# Patient Record
Sex: FEMALE | Race: OTHER | NOT HISPANIC OR LATINO
[De-identification: names, ages, dates, MRNs, and addresses within clinical notes are randomized per-mention and may not be internally consistent; named-entity substitution may affect disease eponyms.]

---

## 2017-04-17 ENCOUNTER — APPOINTMENT (OUTPATIENT)
Dept: PULMONOLOGY | Facility: CLINIC | Age: 62
End: 2017-04-17

## 2017-10-31 ENCOUNTER — RX RENEWAL (OUTPATIENT)
Age: 62
End: 2017-10-31

## 2018-01-08 ENCOUNTER — MEDICATION RENEWAL (OUTPATIENT)
Age: 63
End: 2018-01-08

## 2018-04-04 ENCOUNTER — APPOINTMENT (OUTPATIENT)
Dept: PULMONOLOGY | Facility: CLINIC | Age: 63
End: 2018-04-04

## 2018-04-30 ENCOUNTER — RX RENEWAL (OUTPATIENT)
Age: 63
End: 2018-04-30

## 2018-04-30 ENCOUNTER — APPOINTMENT (OUTPATIENT)
Dept: PULMONOLOGY | Facility: CLINIC | Age: 63
End: 2018-04-30

## 2018-05-03 ENCOUNTER — APPOINTMENT (OUTPATIENT)
Dept: PULMONOLOGY | Facility: CLINIC | Age: 63
End: 2018-05-03
Payer: COMMERCIAL

## 2018-05-03 VITALS
DIASTOLIC BLOOD PRESSURE: 78 MMHG | BODY MASS INDEX: 33.97 KG/M2 | HEART RATE: 100 BPM | SYSTOLIC BLOOD PRESSURE: 118 MMHG | OXYGEN SATURATION: 96 % | HEIGHT: 64 IN | WEIGHT: 199 LBS | TEMPERATURE: 98.6 F

## 2018-05-03 DIAGNOSIS — J45.901 UNSPECIFIED ASTHMA WITH (ACUTE) EXACERBATION: ICD-10-CM

## 2018-05-03 PROCEDURE — 99214 OFFICE O/P EST MOD 30 MIN: CPT | Mod: 25

## 2018-05-03 PROCEDURE — 94010 BREATHING CAPACITY TEST: CPT

## 2018-11-26 ENCOUNTER — APPOINTMENT (OUTPATIENT)
Dept: PULMONOLOGY | Facility: CLINIC | Age: 63
End: 2018-11-26
Payer: COMMERCIAL

## 2018-11-26 VITALS
BODY MASS INDEX: 33.97 KG/M2 | TEMPERATURE: 97.2 F | OXYGEN SATURATION: 98 % | HEIGHT: 64 IN | WEIGHT: 199 LBS | SYSTOLIC BLOOD PRESSURE: 130 MMHG | HEART RATE: 88 BPM | DIASTOLIC BLOOD PRESSURE: 88 MMHG

## 2018-11-26 PROCEDURE — 90686 IIV4 VACC NO PRSV 0.5 ML IM: CPT

## 2018-11-26 PROCEDURE — G0008: CPT

## 2018-11-26 PROCEDURE — 99213 OFFICE O/P EST LOW 20 MIN: CPT | Mod: 25

## 2018-11-26 PROCEDURE — 94010 BREATHING CAPACITY TEST: CPT

## 2018-11-26 RX ORDER — PREDNISONE 10 MG/1
10 TABLET ORAL
Qty: 30 | Refills: 0 | Status: COMPLETED | COMMUNITY
Start: 2018-05-03 | End: 2018-11-26

## 2019-06-24 ENCOUNTER — APPOINTMENT (OUTPATIENT)
Dept: PULMONOLOGY | Facility: CLINIC | Age: 64
End: 2019-06-24
Payer: COMMERCIAL

## 2019-06-24 VITALS
WEIGHT: 214 LBS | DIASTOLIC BLOOD PRESSURE: 76 MMHG | SYSTOLIC BLOOD PRESSURE: 120 MMHG | OXYGEN SATURATION: 97 % | BODY MASS INDEX: 36.54 KG/M2 | TEMPERATURE: 97.6 F | HEIGHT: 64 IN | HEART RATE: 82 BPM

## 2019-06-24 DIAGNOSIS — F17.200 NICOTINE DEPENDENCE, UNSPECIFIED, UNCOMPLICATED: ICD-10-CM

## 2019-06-24 PROCEDURE — 99213 OFFICE O/P EST LOW 20 MIN: CPT | Mod: 25

## 2019-06-24 PROCEDURE — 94010 BREATHING CAPACITY TEST: CPT

## 2019-06-24 NOTE — PHYSICAL EXAM
[General Appearance - In No Acute Distress] : no acute distress [Heart Rate And Rhythm] : heart rate and rhythm were normal [Normal Oropharynx] : normal oropharynx [Heart Sounds] : normal S1 and S2 [Murmurs] : no murmurs present [Auscultation Breath Sounds / Voice Sounds] : lungs were clear to auscultation bilaterally

## 2019-06-24 NOTE — ASSESSMENT
[FreeTextEntry1] : Data reviewed:\par \par PA and lateral chest x-ray [address] Radiology 10/26/15 report only provided: No focal consolidation or effusion seen\par \par Spirometry 11/4/15: mild EAO, ratio 64%, FEV1 79% (Advair 250/50) / FENO 52\par PFT 12/3/15 on Advair 500/50: no EAO and no response to IBD, hyperinflation/gas trapping, normal DLCO\par [place] 4/11/16: normal\par [place] 10/17/16: normal\par [place] 5/3/18: severe obstruction. FEV1 50%\par [place] 11/27/18: normal\par [place] 6/24/19: normal\par \par Impression:\par Asthma without exacerbation\par Tobacco dependence - ~1/2ppd x 40 years\par \par Plan:\par Continue Advair 500/50. Albuterol prn.\par Smoking cessation encouraged.\par Not a candidate for LDCT screening by chris fine.\par Routine follow up 6 mos.

## 2019-12-10 ENCOUNTER — APPOINTMENT (OUTPATIENT)
Dept: PULMONOLOGY | Facility: CLINIC | Age: 64
End: 2019-12-10
Payer: COMMERCIAL

## 2019-12-10 VITALS
BODY MASS INDEX: 35.34 KG/M2 | HEART RATE: 90 BPM | WEIGHT: 207 LBS | TEMPERATURE: 97.4 F | DIASTOLIC BLOOD PRESSURE: 68 MMHG | OXYGEN SATURATION: 97 % | HEIGHT: 64 IN | SYSTOLIC BLOOD PRESSURE: 122 MMHG

## 2019-12-10 DIAGNOSIS — J45.909 UNSPECIFIED ASTHMA, UNCOMPLICATED: ICD-10-CM

## 2019-12-10 PROCEDURE — 94010 BREATHING CAPACITY TEST: CPT

## 2019-12-10 PROCEDURE — 99213 OFFICE O/P EST LOW 20 MIN: CPT | Mod: 25

## 2019-12-10 RX ORDER — NICOTINE POLACRILEX 2 MG/1
2 GUM, CHEWING BUCCAL
Qty: 1 | Refills: 1 | Status: ACTIVE | COMMUNITY
Start: 2019-12-10 | End: 1900-01-01

## 2019-12-10 NOTE — ASSESSMENT
[FreeTextEntry1] : Data reviewed:\par \par PA/lat CXR [address] Radiology 10/26/15 report only provided: No focal consolidation or effusion seen\par \par Spirometry 11/4/15: mild EAO, ratio 64%, FEV1 79% (Advair 250/50) / FENO 52\par PFT 12/3/15 on Advair 500/50: no EAO and no response to IBD, hyperinflation/gas trapping, normal DLCO\par [place] 4/11/16: normal\par [place] 10/17/16: normal\par [place] 5/3/18: severe obstruction. FEV1 50%\par [place] 11/27/18: normal\par [place] 6/24/19: normal\par [place] 12/10/19: normal\par \par Impression:\par Asthma without exacerbation\par Tobacco dependence - ~1/2ppd x 40 years, working on quitting\par \par Plan:\par Continue Advair 500/50. Albuterol prn.\par Smoking cessation encouraged. Gum rx'd.\par Not a candidate for LDCT screening by chris fine.\par Routine follow up 6 mos.

## 2019-12-10 NOTE — HISTORY OF PRESENT ILLNESS
[FreeTextEntry1] : 11/4/15: Asked to evaluate patient, a daily smoker, by Dr. Corona for asthma/bronchitis. Symptoms started about a month ago with dyspnea, wheezing, chest tightness, cough with sensation of phlegm that she can't clear. Gets this syndrome 1-2x a year. Sought care early on and got Abx, prednisone, albuterol nebs with improvement. She was seen again 10/26 and given Advair 250, Singulair, prednisone and Augmentin. She wakes up with a sense of chest congestion. She doesn't clearly relate having asthma but says she was on Advair 250 prior to this illness and taking it faithfully prior to this illness. She uses Advair 250 year round. No childhood asthma. Was admitted at [facility] for respiratory disease a couple of years ago. Ends up on prednisone at least annually it sounds like. [place] showed mild EAO and FENO was 52. Increased her to Advair 500/50 and rx'd Nicotrol.\par \par 12/3/15: Thinks the higher dose Advair helped and she is breathing normally. She has a sense of constant congestion in her head. Feels PND. Taking Tylenol Sinus. Using albuterol bid standing but not at all prn. Still smoking 2-3 cigs a day and waiting for beginning of year to try the Nicotrol inhaler.\par \par 4/11/16: Doing well on Advair 500/50. Not needing albuterol. No intercurrent exacerbations. Does have a productive cough in the morning. Thinks she has a little PND. Says she's smoking 2-3 cigarettes a day, 1 pack per week. Not using the Nicotrol. We left her on Advair 500/50 due to a history of poor control on a lower dose, and we discussed smoking cessation.\par \par 10/17/16: Comes in having cold/allergy symptoms and chest congestion. Saw MD, got azithro and nasal spray. Has a sensation of phlegm she can't clear. Chest noisy in the morning. Not dyspneic, no fever. Missed a few doses of Advair right before this happened, in the setting of her brother's death, but otherwise faithful. Still smoking < 1/2 ppd. Still hasn't used the Nicotrol inhaler.\par \par 5/3/18: Continues on Advair 500/50 with good reported compliance. 2-3 intercurrent [facility] visits w steroids since 10/2016. Over weekend developed chest tightness, dyspnea, discolored sputum, no fever. No fever. Great nephew who she gets from school had a cold, he's 4. No clear URI or allergic sx. Needing albuterol many times a day. Getting a bit worse over this week. Thinks she needs a new nebulizer. Smoking 1/2 ppd. Tried Nicotrol inhaler but really not ready to quit.\par \par 11/26/18: One [facility] visit since seeing me last w steroids. No hospitalizations. Day to day doing ok. Remains on Advair 500/50 bid. Albuterol generally less than once a week. No nocturnal awakenings. No daytime limitation. Needs flu vaccine. Stops and starts smoking, doesn't at all if caring for her nieces and nephews. Otherwise smokes 3-4 a day.\par \par 6/24/19: No ED but maybe one [facility] for steroids since seeing me last. She has been changed to [place] but is back to Advair now. Takes bid. Uses albuterol if humid. No nocturnal awakenings. Remains active. Still smoking same amount.\par \par 12/10/19: Mid-Nov had a cold she couldn't shake w ongoing PND but still has some chest crackling. Still coughs, feels like she can't clear sputum. Still on Advair, was needing more albuterol during the cold but now back to normal. Not dyspneic, no fever. Had flu shot. Smoking most days but does go some days without smoking. Would like to try gum.

## 2019-12-10 NOTE — PHYSICAL EXAM
[General Appearance - Well Developed] : well developed [General Appearance - Well Nourished] : well nourished [General Appearance - In No Acute Distress] : no acute distress [Heart Rate And Rhythm] : heart rate and rhythm were normal [Heart Sounds] : normal S1 and S2 [Murmurs] : no murmurs present [Auscultation Breath Sounds / Voice Sounds] : lungs were clear to auscultation bilaterally [Affect] : the affect was normal

## 2020-06-10 ENCOUNTER — APPOINTMENT (OUTPATIENT)
Dept: PULMONOLOGY | Facility: CLINIC | Age: 65
End: 2020-06-10

## 2020-07-23 RX ORDER — FLUTICASONE PROPIONATE AND SALMETEROL 232; 14 UG/1; UG/1
232-14 POWDER, METERED RESPIRATORY (INHALATION)
Qty: 1 | Refills: 11 | Status: DISCONTINUED | COMMUNITY
Start: 2020-07-22 | End: 2020-07-23

## 2020-07-23 RX ORDER — FLUTICASONE PROPIONATE AND SALMETEROL XINAFOATE 230; 21 UG/1; UG/1
230-21 AEROSOL, METERED RESPIRATORY (INHALATION)
Qty: 1 | Refills: 11 | Status: ACTIVE | COMMUNITY
Start: 2020-07-23 | End: 1900-01-01

## 2021-01-22 NOTE — HISTORY OF PRESENT ILLNESS
Pt and  given discharge instructions. Pt and  verbalized understanding of instructions, medications, and follow up appts. Pt declined w/c transportation. Pt ambulatory at discharge. .Electronically signed by Oma Green RN on 1/22/2021 at 3:06 PM [FreeTextEntry1] : 11/4/15: Asked to evaluate patient, a daily smoker, by Dr. Corona for asthma/bronchitis. Symptoms started about a month ago with dyspnea, wheezing, chest tightness, cough with sensation of phlegm that she can't clear. Gets this syndrome 1-2x a year. Sought care early on and got Abx, prednisone, albuterol nebs with improvement. She was seen again 10/26 and given Advair 250, Singulair, prednisone and Augmentin. She wakes up with a sense of chest congestion. She doesn't clearly relate having asthma but says she was on Advair 250 prior to this illness and taking it faithfully prior to this illness. She uses Advair 250 year round. No childhood asthma. Was admitted at [facility] for respiratory disease a couple of years ago. Ends up on prednisone at least annually it sounds like. [place] showed mild EAO and FENO was 52. Increased her to Advair 500/50 and rx'd Nicotrol.\par \par 12/3/15: Thinks the higher dose Advair helped and she is breathing normally. She has a sense of constant congestion in her head. Feels PND. Taking Tylenol Sinus. Using albuterol bid standing but not at all prn. Still smoking 2-3 cigs a day and waiting for beginning of year to try the Nicotrol inhaler.\par \par 4/11/16: Doing well on Advair 500/50. Not needing albuterol. No intercurrent exacerbations. Does have a productive cough in the morning. Thinks she has a little PND. Says she's smoking 2-3 cigarettes a day, 1 pack per week. Not using the Nicotrol. We left her on Advair 500/50 due to a history of poor control on a lower dose, and we discussed smoking cessation.\par \par 10/17/16: Comes in having cold/allergy symptoms and chest congestion. Saw MD, got azithro and nasal spray. Has a sensation of phlegm she can't clear. Chest noisy in the morning. Not dyspneic, no fever. Missed a few doses of Advair right before this happened, in the setting of her brother's death, but otherwise faithful. Still smoking < 1/2 ppd. Still hasn't used the Nicotrol inhaler.\par \par 5/3/18: Continues on Advair 500/50 with good reported compliance. 2-3 intercurrent [facility] visits w steroids since 10/2016. Over weekend developed chest tightness, dyspnea, discolored sputum, no fever. No fever. Great nephew who she gets from school had a cold, he's 4. No clear URI or allergic sx. Needing albuterol many times a day. Getting a bit worse over this week. Thinks she needs a new nebulizer. Smoking 1/2 ppd. Tried Nicotrol inhaler but really not ready to quit.\par \par 11/26/18: One [facility] visit since seeing me last w steroids. No hospitalizations. Day to day doing ok. Remains on Advair 500/50 bid. Albuterol generally less than once a week. No nocturnal awakenings. No daytime limitation. Needs flu vaccine. Stops and starts smoking, doesn't at all if caring for her nieces and nephews. Otherwise smokes 3-4 a day.\par \par 6/24/19: No ED but maybe one [facility] for steroids since seeing me last. She has been changed to [place]ix[place] but is back to Advair now. Takes bid. Uses albuterol if humid. No nocturnal awakenings. Remains active. Still smoking same amount.

## 2021-07-22 ENCOUNTER — RX RENEWAL (OUTPATIENT)
Age: 66
End: 2021-07-22